# Patient Record
Sex: FEMALE | Race: WHITE | NOT HISPANIC OR LATINO | URBAN - METROPOLITAN AREA
[De-identification: names, ages, dates, MRNs, and addresses within clinical notes are randomized per-mention and may not be internally consistent; named-entity substitution may affect disease eponyms.]

---

## 2017-02-05 ENCOUNTER — EMERGENCY (EMERGENCY)
Facility: HOSPITAL | Age: 52
LOS: 1 days | Discharge: HOME | End: 2017-02-05
Admitting: EMERGENCY MEDICINE
Payer: MEDICAID

## 2017-02-05 DIAGNOSIS — Z79.899 OTHER LONG TERM (CURRENT) DRUG THERAPY: ICD-10-CM

## 2017-02-05 DIAGNOSIS — L02.414 CUTANEOUS ABSCESS OF LEFT UPPER LIMB: ICD-10-CM

## 2017-02-05 DIAGNOSIS — F17.200 NICOTINE DEPENDENCE, UNSPECIFIED, UNCOMPLICATED: ICD-10-CM

## 2017-02-05 DIAGNOSIS — Z23 ENCOUNTER FOR IMMUNIZATION: ICD-10-CM

## 2017-02-05 PROCEDURE — 90471 IMMUNIZATION ADMIN: CPT

## 2017-02-05 PROCEDURE — 10061 I&D ABSCESS COMP/MULTIPLE: CPT

## 2017-02-05 PROCEDURE — 99283 EMERGENCY DEPT VISIT LOW MDM: CPT | Mod: 25

## 2017-02-05 PROCEDURE — 87070 CULTURE OTHR SPECIMN AEROBIC: CPT

## 2017-04-03 ENCOUNTER — APPOINTMENT (OUTPATIENT)
Dept: FAMILY MEDICINE | Facility: CLINIC | Age: 52
End: 2017-04-03

## 2017-04-03 VITALS
WEIGHT: 139 LBS | HEART RATE: 80 BPM | DIASTOLIC BLOOD PRESSURE: 70 MMHG | BODY MASS INDEX: 22.34 KG/M2 | HEIGHT: 66 IN | RESPIRATION RATE: 14 BRPM | SYSTOLIC BLOOD PRESSURE: 120 MMHG

## 2017-04-03 DIAGNOSIS — M54.5 LOW BACK PAIN: ICD-10-CM

## 2017-04-03 RX ORDER — CYCLOBENZAPRINE HYDROCHLORIDE 5 MG/1
5 TABLET, FILM COATED ORAL
Qty: 20 | Refills: 0 | Status: ACTIVE | COMMUNITY
Start: 2017-04-03 | End: 1900-01-01

## 2017-04-03 RX ORDER — OXYCODONE AND ACETAMINOPHEN 5; 325 MG/1; MG/1
5-325 TABLET ORAL EVERY 6 HOURS
Qty: 20 | Refills: 0 | Status: ACTIVE | COMMUNITY
Start: 2017-04-03 | End: 1900-01-01

## 2017-04-03 RX ORDER — NAPROXEN 500 MG/1
500 TABLET ORAL
Qty: 14 | Refills: 0 | Status: ACTIVE | COMMUNITY
Start: 2017-04-03 | End: 1900-01-01

## 2017-05-31 ENCOUNTER — APPOINTMENT (OUTPATIENT)
Dept: FAMILY MEDICINE | Facility: CLINIC | Age: 52
End: 2017-05-31

## 2017-09-29 ENCOUNTER — EMERGENCY (EMERGENCY)
Facility: HOSPITAL | Age: 52
LOS: 1 days | Discharge: HOME | End: 2017-09-29
Admitting: EMERGENCY MEDICINE
Payer: MEDICAID

## 2017-09-29 DIAGNOSIS — L02.512 CUTANEOUS ABSCESS OF LEFT HAND: ICD-10-CM

## 2017-09-29 DIAGNOSIS — Z79.2 LONG TERM (CURRENT) USE OF ANTIBIOTICS: ICD-10-CM

## 2017-09-29 DIAGNOSIS — Z79.899 OTHER LONG TERM (CURRENT) DRUG THERAPY: ICD-10-CM

## 2017-09-29 DIAGNOSIS — F17.200 NICOTINE DEPENDENCE, UNSPECIFIED, UNCOMPLICATED: ICD-10-CM

## 2017-09-29 DIAGNOSIS — L03.114 CELLULITIS OF LEFT UPPER LIMB: ICD-10-CM

## 2017-09-29 PROCEDURE — 73130 X-RAY EXAM OF HAND: CPT | Mod: 26,LT

## 2017-09-29 PROCEDURE — 10061 I&D ABSCESS COMP/MULTIPLE: CPT

## 2017-09-29 PROCEDURE — 99284 EMERGENCY DEPT VISIT MOD MDM: CPT | Mod: 25

## 2017-09-30 PROCEDURE — 96374 THER/PROPH/DIAG INJ IV PUSH: CPT | Mod: XU

## 2017-09-30 PROCEDURE — 85027 COMPLETE CBC AUTOMATED: CPT

## 2017-09-30 PROCEDURE — 99284 EMERGENCY DEPT VISIT MOD MDM: CPT | Mod: 25

## 2017-09-30 PROCEDURE — 96375 TX/PRO/DX INJ NEW DRUG ADDON: CPT | Mod: XU

## 2017-09-30 PROCEDURE — 85730 THROMBOPLASTIN TIME PARTIAL: CPT

## 2017-09-30 PROCEDURE — 73130 X-RAY EXAM OF HAND: CPT

## 2017-09-30 PROCEDURE — 10061 I&D ABSCESS COMP/MULTIPLE: CPT

## 2017-09-30 PROCEDURE — 80048 BASIC METABOLIC PNL TOTAL CA: CPT

## 2017-09-30 PROCEDURE — 85610 PROTHROMBIN TIME: CPT

## 2017-11-03 ENCOUNTER — APPOINTMENT (OUTPATIENT)
Dept: FAMILY MEDICINE | Facility: CLINIC | Age: 52
End: 2017-11-03

## 2019-04-01 ENCOUNTER — OUTPATIENT (OUTPATIENT)
Dept: OUTPATIENT SERVICES | Facility: HOSPITAL | Age: 54
LOS: 1 days | Discharge: HOME | End: 2019-04-01

## 2019-04-01 DIAGNOSIS — F11.11 OPIOID ABUSE, IN REMISSION: ICD-10-CM

## 2019-04-01 LAB
AMPHET UR-MCNC: NEGATIVE — SIGNIFICANT CHANGE UP
BARBITURATES UR SCN-MCNC: NEGATIVE — SIGNIFICANT CHANGE UP
BENZODIAZ UR-MCNC: NEGATIVE — SIGNIFICANT CHANGE UP
CANNABINOIDS UR-MCNC: NEGATIVE — SIGNIFICANT CHANGE UP
COCAINE METAB.OTHER UR-MCNC: POSITIVE — SIGNIFICANT CHANGE UP
METHADONE UR-MCNC: NEGATIVE — SIGNIFICANT CHANGE UP
OPIATES UR-MCNC: POSITIVE — SIGNIFICANT CHANGE UP
OXYCODONE UR-MCNC: NEGATIVE — SIGNIFICANT CHANGE UP
PCP UR-MCNC: NEGATIVE — SIGNIFICANT CHANGE UP

## 2019-04-02 DIAGNOSIS — F11.20 OPIOID DEPENDENCE, UNCOMPLICATED: ICD-10-CM

## 2019-04-03 LAB
ALBUMIN SERPL ELPH-MCNC: 4.4 G/DL — SIGNIFICANT CHANGE UP (ref 3.3–5)
ALP SERPL-CCNC: 87 U/L — SIGNIFICANT CHANGE UP (ref 40–120)
ALT FLD-CCNC: 12 U/L — SIGNIFICANT CHANGE UP (ref 4–33)
ANION GAP SERPL CALC-SCNC: 11 MMO/L — SIGNIFICANT CHANGE UP (ref 7–14)
APPEARANCE UR: CLEAR — SIGNIFICANT CHANGE UP
AST SERPL-CCNC: 17 U/L — SIGNIFICANT CHANGE UP (ref 4–32)
BACTERIA # UR AUTO: NEGATIVE — SIGNIFICANT CHANGE UP
BILIRUB SERPL-MCNC: < 0.2 MG/DL — LOW (ref 0.2–1.2)
BILIRUB UR-MCNC: NEGATIVE — SIGNIFICANT CHANGE UP
BLOOD UR QL VISUAL: HIGH
BUN SERPL-MCNC: 12 MG/DL — SIGNIFICANT CHANGE UP (ref 7–23)
CALCIUM SERPL-MCNC: 9.4 MG/DL — SIGNIFICANT CHANGE UP (ref 8.4–10.5)
CHLORIDE SERPL-SCNC: 100 MMOL/L — SIGNIFICANT CHANGE UP (ref 98–107)
CO2 SERPL-SCNC: 29 MMOL/L — SIGNIFICANT CHANGE UP (ref 22–31)
COLOR SPEC: YELLOW — SIGNIFICANT CHANGE UP
CREAT SERPL-MCNC: 0.85 MG/DL — SIGNIFICANT CHANGE UP (ref 0.5–1.3)
GLUCOSE SERPL-MCNC: 85 MG/DL — SIGNIFICANT CHANGE UP (ref 70–99)
GLUCOSE UR-MCNC: NEGATIVE — SIGNIFICANT CHANGE UP
HAV IGG SER QL IA: NONREACTIVE — SIGNIFICANT CHANGE UP
HBV CORE AB SER-ACNC: NONREACTIVE — SIGNIFICANT CHANGE UP
HBV SURFACE AB SER-ACNC: NONREACTIVE — SIGNIFICANT CHANGE UP
HCG SERPL-ACNC: < 5 MIU/ML — SIGNIFICANT CHANGE UP
HCT VFR BLD CALC: 42.5 % — SIGNIFICANT CHANGE UP (ref 34.5–45)
HCV AB S/CO SERPL IA: 0.18 S/CO — SIGNIFICANT CHANGE UP (ref 0–0.79)
HCV AB SERPL-IMP: SIGNIFICANT CHANGE UP
HGB BLD-MCNC: 12.9 G/DL — SIGNIFICANT CHANGE UP (ref 11.5–15.5)
HYALINE CASTS # UR AUTO: SIGNIFICANT CHANGE UP
KETONES UR-MCNC: NEGATIVE — SIGNIFICANT CHANGE UP
LEUKOCYTE ESTERASE UR-ACNC: SIGNIFICANT CHANGE UP
MCHC RBC-ENTMCNC: 25.9 PG — LOW (ref 27–34)
MCHC RBC-ENTMCNC: 30.4 % — LOW (ref 32–36)
MCV RBC AUTO: 85.2 FL — SIGNIFICANT CHANGE UP (ref 80–100)
NITRITE UR-MCNC: NEGATIVE — SIGNIFICANT CHANGE UP
NRBC # FLD: 0 K/UL — SIGNIFICANT CHANGE UP (ref 0–0)
PH UR: 6.5 — SIGNIFICANT CHANGE UP (ref 5–8)
PLATELET # BLD AUTO: 408 K/UL — HIGH (ref 150–400)
PMV BLD: 10.4 FL — SIGNIFICANT CHANGE UP (ref 7–13)
POTASSIUM SERPL-MCNC: 4.4 MMOL/L — SIGNIFICANT CHANGE UP (ref 3.5–5.3)
POTASSIUM SERPL-SCNC: 4.4 MMOL/L — SIGNIFICANT CHANGE UP (ref 3.5–5.3)
PROT SERPL-MCNC: 7.4 G/DL — SIGNIFICANT CHANGE UP (ref 6–8.3)
PROT UR-MCNC: 20 — SIGNIFICANT CHANGE UP
RBC # BLD: 4.99 M/UL — SIGNIFICANT CHANGE UP (ref 3.8–5.2)
RBC # FLD: 14.4 % — SIGNIFICANT CHANGE UP (ref 10.3–14.5)
RBC CASTS # UR COMP ASSIST: HIGH (ref 0–?)
SODIUM SERPL-SCNC: 140 MMOL/L — SIGNIFICANT CHANGE UP (ref 135–145)
SP GR SPEC: 1.03 — SIGNIFICANT CHANGE UP (ref 1–1.04)
SQUAMOUS # UR AUTO: SIGNIFICANT CHANGE UP
T PALLIDUM AB TITR SER: NEGATIVE — SIGNIFICANT CHANGE UP
UROBILINOGEN FLD QL: SIGNIFICANT CHANGE UP
WBC # BLD: 7.39 K/UL — SIGNIFICANT CHANGE UP (ref 3.8–10.5)
WBC # FLD AUTO: 7.39 K/UL — SIGNIFICANT CHANGE UP (ref 3.8–10.5)
WBC UR QL: HIGH (ref 0–?)

## 2019-04-09 LAB
CHOLEST SERPL-MCNC: 201 MG/DL — HIGH (ref 120–199)
HBV SURFACE AG SER-ACNC: NEGATIVE — SIGNIFICANT CHANGE UP
HCG SERPL-ACNC: < 5 MIU/ML — SIGNIFICANT CHANGE UP
HDLC SERPL-MCNC: 49 MG/DL — SIGNIFICANT CHANGE UP (ref 45–65)
LIPID PNL WITH DIRECT LDL SERPL: 149 MG/DL — SIGNIFICANT CHANGE UP
TRIGL SERPL-MCNC: 127 MG/DL — SIGNIFICANT CHANGE UP (ref 10–149)

## 2020-03-02 LAB
ALBUMIN SERPL ELPH-MCNC: 4.5 G/DL — SIGNIFICANT CHANGE UP (ref 3.3–5)
ALP SERPL-CCNC: 139 U/L — HIGH (ref 40–120)
ALT FLD-CCNC: 12 U/L — SIGNIFICANT CHANGE UP (ref 4–33)
ANION GAP SERPL CALC-SCNC: 12 MMO/L — SIGNIFICANT CHANGE UP (ref 7–14)
AST SERPL-CCNC: 21 U/L — SIGNIFICANT CHANGE UP (ref 4–32)
BILIRUB SERPL-MCNC: 0.4 MG/DL — SIGNIFICANT CHANGE UP (ref 0.2–1.2)
BUN SERPL-MCNC: 6 MG/DL — LOW (ref 7–23)
CALCIUM SERPL-MCNC: 9.2 MG/DL — SIGNIFICANT CHANGE UP (ref 8.4–10.5)
CHLORIDE SERPL-SCNC: 96 MMOL/L — LOW (ref 98–107)
CO2 SERPL-SCNC: 30 MMOL/L — SIGNIFICANT CHANGE UP (ref 22–31)
CREAT SERPL-MCNC: 1.01 MG/DL — SIGNIFICANT CHANGE UP (ref 0.5–1.3)
GLUCOSE SERPL-MCNC: 82 MG/DL — SIGNIFICANT CHANGE UP (ref 70–99)
HCT VFR BLD CALC: 43.9 % — SIGNIFICANT CHANGE UP (ref 34.5–45)
HGB BLD-MCNC: 14 G/DL — SIGNIFICANT CHANGE UP (ref 11.5–15.5)
MAGNESIUM SERPL-MCNC: 2.2 MG/DL — SIGNIFICANT CHANGE UP (ref 1.6–2.6)
MCHC RBC-ENTMCNC: 29.6 PG — SIGNIFICANT CHANGE UP (ref 27–34)
MCHC RBC-ENTMCNC: 31.9 % — LOW (ref 32–36)
MCV RBC AUTO: 92.8 FL — SIGNIFICANT CHANGE UP (ref 80–100)
NRBC # FLD: 0 K/UL — SIGNIFICANT CHANGE UP (ref 0–0)
PLATELET # BLD AUTO: 274 K/UL — SIGNIFICANT CHANGE UP (ref 150–400)
PMV BLD: 11.2 FL — SIGNIFICANT CHANGE UP (ref 7–13)
POTASSIUM SERPL-MCNC: 4.2 MMOL/L — SIGNIFICANT CHANGE UP (ref 3.5–5.3)
POTASSIUM SERPL-SCNC: 4.2 MMOL/L — SIGNIFICANT CHANGE UP (ref 3.5–5.3)
PROT SERPL-MCNC: 7.2 G/DL — SIGNIFICANT CHANGE UP (ref 6–8.3)
RBC # BLD: 4.73 M/UL — SIGNIFICANT CHANGE UP (ref 3.8–5.2)
RBC # FLD: 13.7 % — SIGNIFICANT CHANGE UP (ref 10.3–14.5)
SODIUM SERPL-SCNC: 138 MMOL/L — SIGNIFICANT CHANGE UP (ref 135–145)
TSH SERPL-MCNC: 3.03 UIU/ML — SIGNIFICANT CHANGE UP (ref 0.27–4.2)
WBC # BLD: 5.24 K/UL — SIGNIFICANT CHANGE UP (ref 3.8–10.5)
WBC # FLD AUTO: 5.24 K/UL — SIGNIFICANT CHANGE UP (ref 3.8–10.5)

## 2020-08-30 ENCOUNTER — EMERGENCY (EMERGENCY)
Facility: HOSPITAL | Age: 55
LOS: 1 days | End: 2020-08-30
Attending: EMERGENCY MEDICINE | Admitting: EMERGENCY MEDICINE
Payer: MEDICAID

## 2020-08-30 VITALS — TEMPERATURE: 97 F

## 2020-08-30 VITALS — RESPIRATION RATE: 13 BRPM

## 2020-08-30 DIAGNOSIS — I46.9 CARDIAC ARREST, CAUSE UNSPECIFIED: ICD-10-CM

## 2020-08-30 PROCEDURE — 92950 HEART/LUNG RESUSCITATION CPR: CPT

## 2020-08-30 PROCEDURE — 99285 EMERGENCY DEPT VISIT HI MDM: CPT | Mod: 25

## 2020-08-30 NOTE — ED PROVIDER NOTE - PHYSICAL EXAMINATION
no respirations no cardiac activite, pt blue upon arrival no respirations   no cardiac activity   pt blue with lividities to the skin anteriorly upon arrival.  no corneal reflex  no sign of trauma - no bruising or bleeding. No hematoma.   no track marks noted (one old appearing punctate aishwarya to the left antecubital fossa).

## 2020-08-30 NOTE — ED ADULT NURSE REASSESSMENT NOTE - NS ED NURSE REASSESS COMMENT FT1
PT has been accepted as a ME Case, Security [facility]

## 2020-08-30 NOTE — ED PROVIDER NOTE - CLINICAL SUMMARY MEDICAL DECISION MAKING FREE TEXT BOX
Pt is 56 y/o female with PMhx of possible drug abuse (no previous records or visits) brought in by EMS for cardiac arrest. Unknown downtime. Possible heroin overdose.  Received 2 amps of bicard, Epi x 4, 4 Narcan, no spontaneous respiration and in asystole. Arrived directly to trauma room, with Dg on. Pronouced at 11:54am. Pt is 54 y/o female with PMhx of possible drug abuse (no previous records or visits) brought in by EMS for cardiac arrest. Unknown downtime. Possible heroin overdose.  Received 2 amps of bicarb, Epi x 4, 4 Narcan, no spontaneous respiration and in asystole. Arrived directly to trauma room, with Dg on. Pronouced at 11:54am after unsuccessful resuscitative efforts by EMS and ED team.  Exam as stated. On questioning EMS, there were no needles or pills that they saw around her. I discussed with her mother, who states that she last spoke to her last night. No concerns at that time. Today, she found her on her floor with her feet entangled in sheets on the floor. Unknown why or how this occurred.   I discussed with Bri Hinton,  at the [facility] who has accepted the case.

## 2020-08-30 NOTE — ED PROVIDER NOTE - ATTENDING CONTRIBUTION TO CARE
Lashawn with [facility]AV[facility] Tabares. Pt is 56 y/o female with PMhx of possible drug abuse (no previous records or visits) brought in by EMS for cardiac arrest. Unknown downtime. Possible heroin overdose.  Received 2 amps of bicarb, Epi x 4, 4 Narcan, no spontaneous respiration and in asystole. Arrived directly to trauma room, with Dg on. Pronouced at 11:54am after unsuccessful resuscitative efforts by EMS and ED team.  Exam as stated. On questioning EMS, there were no needles or pills that they saw around her. I discussed with her mother, who states that she last spoke to her last night. No concerns at that time. Today, she found her on her floor with her feet entangled in sheets on the floor. Unknown why or how this occurred.   I discussed with Bri Hinton,  at the [facility] who has accepted the case.  I performed a face to face bedside interview with patient regarding history of present illness, review of symptoms and past medical history. I completed an independent physical exam.  I have discussed the patient's plan of care with Physician Assistant (PA). I agree with note as stated above, having amended the EMR as needed to reflect my findings.   This includes History of Present Illness, HIV, Past Medical/Surgical/Family/Social History, Allergies and Home Medications, Review of Systems, Physical Exam, and any Progress Notes during the time I functioned as the attending physician for this patient.

## 2020-08-30 NOTE — ED ADULT NURSE NOTE - OBJECTIVE STATEMENT
Pt BIBEMS s/p Cardiopulmonary arrest secondary to Heroin overdose. Pt intubated in the field, received 4 of narcan and 4 of epi,  2 of bicarb in the field. Unknown downtime, pt was last heard from 2 hrs prior to EMS arrival. Pt is pulseless, asystolic, intubated ETT 7.0.

## 2020-08-30 NOTE — ED ADULT NURSE NOTE - EXTENSIONS OF SELF_ADULT
silver colored metal necklace, silver colored metal earring (1),Silver colored metal  Belly button ring with stone

## 2020-08-30 NOTE — ED PROVIDER NOTE - OBJECTIVE STATEMENT
Pt is 56 y/o female with PMhx of possible drug abuse (no previous records or visits) brought in by EMS for cardiac arrest. Unknown downtime. Possible heroin overdose.  Received 2 amps of bicard, Epi x 4, 4 Narcan, no spontaneous respiration and in asystole. Arrived directly to trauma room, with Dg on. Pronouced at 11:54am. Pt is 54 y/o female with PMhx of possible drug abuse (no previous records or visits) brought in by EMS for cardiac arrest. Unknown downtime. Possible heroin overdose.  Received 2 amps of bicarb, Epi x 4, 4 Narcan, no spontaneous respiration and in asystole. Arrived directly to trauma room, with Dg on. Pronouced at 11:54am after unsuccessful resuscitative efforts by EMS and ED team. Pt is 54 y/o female with PMhx of possible drug abuse (no previous records or visits) brought in by EMS for cardiac arrest. Unknown downtime. Possible heroin overdose (?). Found by mother in her room, on the floor, tangled in sheets (not sure if face up or down);  Received 2 amps of bicarb, Epi x 4, 4 Narcan, no spontaneous respiration and in asystole. Arrived directly to trauma room, with Dg on. Pronouced at 11:54am after unsuccessful resuscitative efforts by EMS and ED team.

## 2020-08-30 NOTE — ED ADULT NURSE REASSESSMENT NOTE - NS ED NURSE REASSESS COMMENT FT1
CPR in progress with Dg, IO placed by EMS was dislodged during cpr and 20G IV placed in Left hand for venus access.

## 2021-01-25 NOTE — ED ADULT NURSE NOTE - NS ED NURSE LEVEL OF CONSCIOUSNESS ORIENTATION
PCP: Maia Melton MD    No chief complaint on file.   cc: abnormal liver enzymes    You have chosen to receive care through a telehealth visit.  Do you consent to use a video/audio connection for your medical care today? Yes      History of Present Illness:   Roz Randolph is a 60 y.o. female who presents to the GI clinic as a consult for abnormal liver enzymes.  H/o htn, obesity, hld on lipitor. Normal liver enzymes 4/2018.  4/2019, she developed ast/alt > 50.  Subsequently, her liver enzymes have been elevated with alt 80, ast 98 on labs 11/19/20.  She reports gerd controlled off medicine. Denies confusion, gib loss. + family h/o fatty liver disease, son.  No new abdominal swelling.     Past Medical History:   Diagnosis Date   • Abnormal liver enzymes    • Acute diverticulitis 09/2013    sigmoid colon dx by CT (also episodes 6/14, 9/14, and 12/14)   • Anxiety disorder    • Closed fracture of distal phalanx or phalanges of hand 11/2013    Of the fourth finger, right   • Closed head injury 06/2014    CT negative   • Diverticulosis of large intestine    • Gastroesophageal reflux disease    • Heart murmur    • History of esophagogastroduodenoscopy (EGD) 12/21/2015    small hiatal hernia, mild duodenitis, and concentric espohageal rings suggestive of esophsgitis- path c/w mild chronic esophagitis w/ increased eosinophils suggestive of reflux (Estefanynider)   • History of varicella    • Hyperlipidemia     Description: dx 7/08.   • Hypertension     Description: dx approx 2000.   • Hypothyroidism     Description: dx 4/14.   • Lung nodule     Description: 9/13- RLL (4 mm) nodule).  5/2/14- stable.   • Vitamin D deficiency     Dx 10/19       Past Surgical History:   Procedure Laterality Date   • APPENDECTOMY  1952   • CHOLECYSTECTOMY  1993   • SUBTOTAL COLECTOMY  04/13/2018    Robotic LAR (diverticulitis)   • TOTAL ABDOMINAL HYSTERECTOMY WITH SALPINGO OOPHORECTOMY Bilateral 02/2004         Current Outpatient  Medications:   •  atorvastatin (LIPITOR) 10 MG tablet, Take 1 tablet by mouth Daily., Disp: 30 tablet, Rfl: 5  •  buPROPion XL (WELLBUTRIN XL) 150 MG 24 hr tablet, Take 1 tablet by mouth Every Morning., Disp: 90 tablet, Rfl: 1  •  Cholecalciferol (VITAMIN D3) 25 MCG (1000 UT) capsule, Take  by mouth., Disp: , Rfl:   •  levothyroxine (SYNTHROID, LEVOTHROID) 88 MCG tablet, Take 1 tablet by mouth Daily., Disp: 30 tablet, Rfl: 5  •  losartan-hydrochlorothiazide (Hyzaar) 100-25 MG per tablet, Take 1 tablet by mouth Daily., Disp: 90 tablet, Rfl: 3    Allergies   Allergen Reactions   • Augmentin [Amoxicillin-Pot Clavulanate] Rash   • Morphine And Related Rash   • Tetracyclines & Related Rash, GI Intolerance and Swelling       Family History   Problem Relation Age of Onset   • Diabetes Mother    • Hypertension Mother    • Hypothyroidism Mother    • Coronary artery disease Father 47        MI age 47 and 65   • Hypertension Father    • Hypothyroidism Sister    • Diabetes Maternal Aunt    • Breast cancer Maternal Aunt    • Coronary artery disease Maternal Grandmother    • Diabetes Maternal Grandmother    • Colon cancer Paternal Grandmother    • Coronary artery disease Paternal Grandfather    • Hypothyroidism Sister    • Ovarian cancer Neg Hx        Social History     Socioeconomic History   • Marital status:      Spouse name: Not on file   • Number of children: 2   • Years of education: Not on file   • Highest education level: Not on file   Occupational History   • Occupation:      Comment: full time   Tobacco Use   • Smoking status: Never Smoker   • Smokeless tobacco: Never Used   Substance and Sexual Activity   • Alcohol use: Yes     Comment: 2-3 glasses of wine weekends   • Drug use: No   • Sexual activity: Yes     Partners: Male     Birth control/protection: Surgical       Review of Systems  All other systems reviewed and are negative.    There were no vitals filed for this visit.    Physical Exam  : limited due to video visit  General Appearance:  Vitals as above. no acute distress  High bmi  Head/face:  Normocephalic, atraumatic  Eyes:   EOMI, no conjunctivitis or icterus   Nose/Sinuses:  Nares patent bilaterally without discharge or lesions  Mouth/Throat:  Normal oral movements without dyskinesia  Neck:  trachea is midline, no thyromegaly  Lungs:  Normal work of breathing effort, no overt rales  Heart:  No overt JVD or type VI murmur  Abdomen:  Nondistended, no guarding or rebound tenderness  Neurologic:  Alert; no focal deficits; age appropriate behavior and speech  Psychiatric: mood and affect are congruent  Vascular: extremities without edema  Skin: no rash or cyanosis.      Assessment/Plan  1.) Abnormal liver enzymes  AST: 98, alt: 80.  Normal liver enzymes 4/2018  Workup: + jordan 1:80, normal asma,   Plan:  Unclear etiology as she doesn't think she gained wt from 4/2018. Differential favors fatty liver disease and I would recommend to avoid alcohol, she is currently minimally using. I do recommend continuing statin therapy unless the patient develops a myopathy and if this is the case, to switch to a different statin.  At present, I would deem her low JORDAN titer as a false positive for AIH, but will evaluate further serologic studies such as IgG level  Plan:  ig profile  ama  a1at phenotype  Wt loss/exercise    2.) GERD  H/o esophageal stricture. Would continue PPI therapy but she would like to avoid medical therapy.    3.) h/o diverticulitis and s/p LAR for complicated diverticulitis  Continue diet that allows for 10 lb wt loss per # 1    Dat Masters MD  1/25/2021   Nonverbal

## 2022-05-24 NOTE — ED ADULT TRIAGE NOTE - RESPIRATORY RATE (BREATHS/MIN)
Assessment & Plan:      Problem List Items Addressed This Visit    None     Visit Diagnoses     Viral pharyngitis    -  Primary    Relevant Orders    Streptococcus A Rapid Screen w/Reflex to PCR - Clinic Collect (Completed)    Group A Streptococcus PCR Throat Swab        Medical Decision Making  Patient presents with acute onset sore throat.  Rapid strep is negative.  Suspect likely viral pharyngitis.  Continue with fluids, rest, honey, and over-the-counter analgesics as needed.  Discussed signs of worsening symptoms and when to follow-up with PCP if no symptom improvement.     Subjective:      History provided by the mother.  Francisca Carlton is a 11 year old female here for evaluation of sore throat.  Onset of symptoms was today.  No other cough, rhinorrhea, or fevers.  Patient recently recovered from COVID-19 1-1/2 months ago and had no symptoms at that time.     The following portions of the patient's history were reviewed and updated as appropriate: allergies, current medications, and problem list.     Review of Systems  Pertinent items are noted in HPI.    Allergies  Allergies   Allergen Reactions     Cats      Other Environmental Allergy        Family History   Problem Relation Age of Onset     Asthma Mother      No Known Problems Father      No Known Problems Maternal Grandmother      No Known Problems Maternal Grandfather      No Known Problems Paternal Grandmother      No Known Problems Paternal Grandfather        Social History     Tobacco Use     Smoking status: Never Smoker     Smokeless tobacco: Never Used   Substance Use Topics     Alcohol use: Not on file        Objective:      BP 96/61   Pulse 98   Temp 98.3  F (36.8  C) (Oral)   Resp 18   Wt 29.5 kg (65 lb)   SpO2 97%   GENERAL ASSESSMENT: active, alert, no acute distress, well hydrated, well nourished, non-toxic  EARS: bilateral TM's and external ear canals normal  NOSE: nasal mucosa, septum, turbinates normal bilaterally  MOUTH: Petechiae  and erythema on the soft palate, mild tonsillar swelling, no exudate, mucous membranes moist, tongue normal  NECK: Mild bilateral anterior cervical of adenopathy  LUNGS: Respiratory effort normal, clear to auscultation, normal breath sounds bilaterally  HEART: Regular rate and rhythm, normal S1/S2, no murmurs, normal pulses and capillary fill     Lab & Imaging Results    Results for orders placed or performed in visit on 05/24/22   Streptococcus A Rapid Screen w/Reflex to PCR - Clinic Collect     Status: Normal    Specimen: Throat; Swab   Result Value Ref Range    Group A Strep antigen Negative Negative     I personally reviewed these results and discussed findings with the patient.    The use of Dragon/[id number] dictation services was used to construct the content of this note; any grammatical errors are non-intentional. Please contact the author directly if you are in need of any clarification.      0

## 2023-06-13 NOTE — ED ADULT NURSE NOTE - SUICIDE SCREENING QUESTION 2
Recommendations for High School Age Children    Nutrition:  Continue to offer balanced meals and snacks.   Limit fast food to once every 2 weeks or less if possible and monitor sugar/carbohydrate intake.  Vitamin D supplements up to 800 units should be considered during the winter months. All teenage female patients should be on a multivitamin daily.    Development:  Your child will continue to progress socially and academically through the high school years.  Monitor social interaction and following rules.  Place limits on screen time and be aware of what your child is watching.  Monitor social media use.     Activity:  Your child should be getting 30-60 minutes of aerobic activity daily.  Make sure your child stays hydrated, water is the best choice.  Make sure your child is wearing sport appropriate safety gear.    Safety:  Broad spectrum sunscreen (SPF 30 or greater) should be used for sun exposure and reapplied as directed.  Bike helmets for bike use.  General outdoor safety with streets, driveways, swimming pools.  Be aware of driving rules for children under 18 years     Immunizations:  Your child is up to date on vaccines and should get a flu vaccine yearly.    
Patient unable to complete